# Patient Record
(demographics unavailable — no encounter records)

---

## 2024-11-04 NOTE — HISTORY OF PRESENT ILLNESS
[2] : 2 [Dull/Aching] : dull/aching [Localized] : localized [Occasional] : occasional [Household chores] : household chores [Leisure] : leisure [Sitting] : sitting [de-identified] : 08/07/2024: 37yo Pt is here complaining of Lt knee pain after slipping and falling in the mud a few days ago; no click or pop. Reports sharp pain medially along with tightness. No numbness or tingling. No hx of knee injury/trauma. Pt went to [facility] ER on 8/4/24, where they took x-rays and prescribed him diclofenac for pain. No prior left knee injury. No pmhx.  8/19/24- Here for MRI review of the left knee.  9.23.24 Patient here for left knee pain. Patient states improvement since las visit. Patient states he still has stiffness  11.4.24 Patient here for left knee pain.  [] : Post Surgical Visit: no [FreeTextEntry9] : diclofenac

## 2024-11-04 NOTE — ASSESSMENT
[FreeTextEntry1] : He is now just under 3 months from a subchondral fracture tibial plateau noted on MRI.  Some residual stiffness and weakness persist.  He will continue to work on this on his own.  He will contact me if any new issues arise.

## 2024-11-04 NOTE — IMAGING
[de-identified] : Knee Exam: Inspection: No effusion, no warmth, no ecchymosis Palpation: Medial joint line tenderness to palpation, negative Gopi Range of motion: 0-130 with mild anterior crepitus Strength: 5/5 quadriceps and hamstring strength Stability: Ligamentously stable Motor and sensory intact distally Gait: Non antalgic gait